# Patient Record
Sex: FEMALE | Race: WHITE | NOT HISPANIC OR LATINO | URBAN - METROPOLITAN AREA
[De-identification: names, ages, dates, MRNs, and addresses within clinical notes are randomized per-mention and may not be internally consistent; named-entity substitution may affect disease eponyms.]

---

## 2021-04-13 ENCOUNTER — EMERGENCY (EMERGENCY)
Facility: HOSPITAL | Age: 66
LOS: 1 days | Discharge: HOME | End: 2021-04-13
Attending: EMERGENCY MEDICINE
Payer: COMMERCIAL

## 2021-04-13 VITALS
TEMPERATURE: 99 F | SYSTOLIC BLOOD PRESSURE: 146 MMHG | RESPIRATION RATE: 18 BRPM | WEIGHT: 235.01 LBS | HEART RATE: 1 BPM | DIASTOLIC BLOOD PRESSURE: 82 MMHG | HEIGHT: 64 IN | OXYGEN SATURATION: 96 %

## 2021-04-13 VITALS
OXYGEN SATURATION: 99 % | RESPIRATION RATE: 17 BRPM | TEMPERATURE: 98 F | DIASTOLIC BLOOD PRESSURE: 81 MMHG | SYSTOLIC BLOOD PRESSURE: 157 MMHG | HEART RATE: 81 BPM

## 2021-04-13 DIAGNOSIS — Z90.12 ACQUIRED ABSENCE OF LEFT BREAST AND NIPPLE: Chronic | ICD-10-CM

## 2021-04-13 DIAGNOSIS — Z98.89 OTHER SPECIFIED POSTPROCEDURAL STATES: Chronic | ICD-10-CM

## 2021-04-13 LAB
ALBUMIN SERPL ELPH-MCNC: 4 G/DL — SIGNIFICANT CHANGE UP (ref 3.3–5)
ALP SERPL-CCNC: 103 U/L — SIGNIFICANT CHANGE UP (ref 40–120)
ALT FLD-CCNC: 48 U/L — HIGH (ref 10–45)
ANION GAP SERPL CALC-SCNC: 12 MMOL/L — SIGNIFICANT CHANGE UP (ref 5–17)
APPEARANCE UR: CLEAR — SIGNIFICANT CHANGE UP
AST SERPL-CCNC: 30 U/L — SIGNIFICANT CHANGE UP (ref 10–40)
BACTERIA # UR AUTO: NEGATIVE — SIGNIFICANT CHANGE UP
BASE EXCESS BLDV CALC-SCNC: 1 MMOL/L — SIGNIFICANT CHANGE UP (ref -2–2)
BASOPHILS # BLD AUTO: 0.03 K/UL — SIGNIFICANT CHANGE UP (ref 0–0.2)
BASOPHILS NFR BLD AUTO: 0.5 % — SIGNIFICANT CHANGE UP (ref 0–2)
BILIRUB SERPL-MCNC: 0.6 MG/DL — SIGNIFICANT CHANGE UP (ref 0.2–1.2)
BILIRUB UR-MCNC: NEGATIVE — SIGNIFICANT CHANGE UP
BUN SERPL-MCNC: 12 MG/DL — SIGNIFICANT CHANGE UP (ref 7–23)
CA-I SERPL-SCNC: 1.23 MMOL/L — SIGNIFICANT CHANGE UP (ref 1.12–1.3)
CALCIUM SERPL-MCNC: 9.4 MG/DL — SIGNIFICANT CHANGE UP (ref 8.4–10.5)
CHLORIDE BLDV-SCNC: 108 MMOL/L — SIGNIFICANT CHANGE UP (ref 96–108)
CHLORIDE SERPL-SCNC: 105 MMOL/L — SIGNIFICANT CHANGE UP (ref 96–108)
CO2 BLDV-SCNC: 29 MMOL/L — SIGNIFICANT CHANGE UP (ref 22–30)
CO2 SERPL-SCNC: 24 MMOL/L — SIGNIFICANT CHANGE UP (ref 22–31)
COLOR SPEC: YELLOW — SIGNIFICANT CHANGE UP
CREAT SERPL-MCNC: 0.59 MG/DL — SIGNIFICANT CHANGE UP (ref 0.5–1.3)
DIFF PNL FLD: NEGATIVE — SIGNIFICANT CHANGE UP
EOSINOPHIL # BLD AUTO: 0.12 K/UL — SIGNIFICANT CHANGE UP (ref 0–0.5)
EOSINOPHIL NFR BLD AUTO: 1.9 % — SIGNIFICANT CHANGE UP (ref 0–6)
EPI CELLS # UR: 2 /HPF — SIGNIFICANT CHANGE UP
GAS PNL BLDV: 139 MMOL/L — SIGNIFICANT CHANGE UP (ref 135–145)
GAS PNL BLDV: SIGNIFICANT CHANGE UP
GAS PNL BLDV: SIGNIFICANT CHANGE UP
GLUCOSE BLDV-MCNC: 147 MG/DL — HIGH (ref 70–99)
GLUCOSE SERPL-MCNC: 151 MG/DL — HIGH (ref 70–99)
GLUCOSE UR QL: NEGATIVE — SIGNIFICANT CHANGE UP
HCO3 BLDV-SCNC: 27 MMOL/L — SIGNIFICANT CHANGE UP (ref 21–29)
HCT VFR BLD CALC: 43.1 % — SIGNIFICANT CHANGE UP (ref 34.5–45)
HCT VFR BLDA CALC: 45 % — SIGNIFICANT CHANGE UP (ref 39–50)
HGB BLD CALC-MCNC: 14.7 G/DL — SIGNIFICANT CHANGE UP (ref 11.5–15.5)
HGB BLD-MCNC: 14.1 G/DL — SIGNIFICANT CHANGE UP (ref 11.5–15.5)
HYALINE CASTS # UR AUTO: 2 /LPF — SIGNIFICANT CHANGE UP (ref 0–2)
IMM GRANULOCYTES NFR BLD AUTO: 0.5 % — SIGNIFICANT CHANGE UP (ref 0–1.5)
KETONES UR-MCNC: ABNORMAL
LACTATE BLDV-MCNC: 1.9 MMOL/L — SIGNIFICANT CHANGE UP (ref 0.7–2)
LEUKOCYTE ESTERASE UR-ACNC: ABNORMAL
LIDOCAIN IGE QN: 30 U/L — SIGNIFICANT CHANGE UP (ref 7–60)
LYMPHOCYTES # BLD AUTO: 1.15 K/UL — SIGNIFICANT CHANGE UP (ref 1–3.3)
LYMPHOCYTES # BLD AUTO: 18.5 % — SIGNIFICANT CHANGE UP (ref 13–44)
MCHC RBC-ENTMCNC: 30.3 PG — SIGNIFICANT CHANGE UP (ref 27–34)
MCHC RBC-ENTMCNC: 32.7 GM/DL — SIGNIFICANT CHANGE UP (ref 32–36)
MCV RBC AUTO: 92.7 FL — SIGNIFICANT CHANGE UP (ref 80–100)
MONOCYTES # BLD AUTO: 0.53 K/UL — SIGNIFICANT CHANGE UP (ref 0–0.9)
MONOCYTES NFR BLD AUTO: 8.5 % — SIGNIFICANT CHANGE UP (ref 2–14)
NEUTROPHILS # BLD AUTO: 4.36 K/UL — SIGNIFICANT CHANGE UP (ref 1.8–7.4)
NEUTROPHILS NFR BLD AUTO: 70.1 % — SIGNIFICANT CHANGE UP (ref 43–77)
NITRITE UR-MCNC: NEGATIVE — SIGNIFICANT CHANGE UP
NRBC # BLD: 0 /100 WBCS — SIGNIFICANT CHANGE UP (ref 0–0)
PCO2 BLDV: 52 MMHG — HIGH (ref 39–42)
PH BLDV: 7.34 — LOW (ref 7.35–7.45)
PH UR: 5.5 — SIGNIFICANT CHANGE UP (ref 5–8)
PLATELET # BLD AUTO: 272 K/UL — SIGNIFICANT CHANGE UP (ref 150–400)
PO2 BLDV: 22 MMHG — LOW (ref 25–45)
POTASSIUM BLDV-SCNC: 4.8 MMOL/L — SIGNIFICANT CHANGE UP (ref 3.5–5.3)
POTASSIUM SERPL-MCNC: 4.5 MMOL/L — SIGNIFICANT CHANGE UP (ref 3.5–5.3)
POTASSIUM SERPL-SCNC: 4.5 MMOL/L — SIGNIFICANT CHANGE UP (ref 3.5–5.3)
PROT SERPL-MCNC: 6.5 G/DL — SIGNIFICANT CHANGE UP (ref 6–8.3)
PROT UR-MCNC: SIGNIFICANT CHANGE UP
RBC # BLD: 4.65 M/UL — SIGNIFICANT CHANGE UP (ref 3.8–5.2)
RBC # FLD: 12.9 % — SIGNIFICANT CHANGE UP (ref 10.3–14.5)
RBC CASTS # UR COMP ASSIST: 2 /HPF — SIGNIFICANT CHANGE UP (ref 0–4)
SAO2 % BLDV: 33 % — LOW (ref 67–88)
SODIUM SERPL-SCNC: 141 MMOL/L — SIGNIFICANT CHANGE UP (ref 135–145)
SP GR SPEC: 1.03 — HIGH (ref 1.01–1.02)
UROBILINOGEN FLD QL: NEGATIVE — SIGNIFICANT CHANGE UP
WBC # BLD: 6.22 K/UL — SIGNIFICANT CHANGE UP (ref 3.8–10.5)
WBC # FLD AUTO: 6.22 K/UL — SIGNIFICANT CHANGE UP (ref 3.8–10.5)
WBC UR QL: 7 /HPF — HIGH (ref 0–5)

## 2021-04-13 PROCEDURE — 82947 ASSAY GLUCOSE BLOOD QUANT: CPT

## 2021-04-13 PROCEDURE — 99284 EMERGENCY DEPT VISIT MOD MDM: CPT | Mod: 25

## 2021-04-13 PROCEDURE — 82803 BLOOD GASES ANY COMBINATION: CPT

## 2021-04-13 PROCEDURE — 84132 ASSAY OF SERUM POTASSIUM: CPT

## 2021-04-13 PROCEDURE — 85014 HEMATOCRIT: CPT

## 2021-04-13 PROCEDURE — 82435 ASSAY OF BLOOD CHLORIDE: CPT

## 2021-04-13 PROCEDURE — 84295 ASSAY OF SERUM SODIUM: CPT

## 2021-04-13 PROCEDURE — 87077 CULTURE AEROBIC IDENTIFY: CPT

## 2021-04-13 PROCEDURE — 83605 ASSAY OF LACTIC ACID: CPT

## 2021-04-13 PROCEDURE — 85025 COMPLETE CBC W/AUTO DIFF WBC: CPT

## 2021-04-13 PROCEDURE — 81001 URINALYSIS AUTO W/SCOPE: CPT

## 2021-04-13 PROCEDURE — 99285 EMERGENCY DEPT VISIT HI MDM: CPT

## 2021-04-13 PROCEDURE — 74177 CT ABD & PELVIS W/CONTRAST: CPT

## 2021-04-13 PROCEDURE — 74177 CT ABD & PELVIS W/CONTRAST: CPT | Mod: 26,MA

## 2021-04-13 PROCEDURE — 87086 URINE CULTURE/COLONY COUNT: CPT

## 2021-04-13 PROCEDURE — 85018 HEMOGLOBIN: CPT

## 2021-04-13 PROCEDURE — 83690 ASSAY OF LIPASE: CPT

## 2021-04-13 PROCEDURE — 82330 ASSAY OF CALCIUM: CPT

## 2021-04-13 PROCEDURE — 80053 COMPREHEN METABOLIC PANEL: CPT

## 2021-04-13 PROCEDURE — 87186 SC STD MICRODIL/AGAR DIL: CPT

## 2021-04-13 NOTE — ED PROVIDER NOTE - NSFOLLOWUPCLINICS_GEN_ALL_ED_FT
[facility]  General Surgery  [address] [place] [address] - 3rd [place] [address]  Phone: [phone]  Fax:   Follow Up Time: Routine

## 2021-04-13 NOTE — ED PROVIDER NOTE - PROGRESS NOTE DETAILS
Alban Coffey, PGY2: CT negative for SBO, only showing hernia above the mesh. Labs non-actionable. UA borderline, no dysuria, will await culture. Discussed results w/ patient. Pain currently controlled if patient not bending over or lifting. Will give surgery referral. Discussed return precautions and all questions answered. Pt in agreement w/ plan. CAOx3, NAD, VSS. Stable for d/c.

## 2021-04-13 NOTE — ED ADULT NURSE NOTE - OBJECTIVE STATEMENT
pt is 66 y/o female A&Ox4 arriving to the ED complaining of abdominal pain for 5 days. pt belly is distended states her bowel movements have been soft or diarrhea like but has been having daily bowel movements.  pt has a history of hernia surgeries in the past, gastric ulcers, states she has a unrepaired hernia above her naval that can be felt on palpation. pt complaining of nausea no vomiting. states she had seen her primary care doctor Friday and had a negative covid test, pt PMH DM hypertension, breast CA, hysterotomy

## 2021-04-13 NOTE — ED PROVIDER NOTE - NSFOLLOWUPINSTRUCTIONS_ED_ALL_ED_FT
Hernia    A hernia is when fat or the intestines push through a weak area in the abdominal wall. This can occur around the belly button (umbilical hernia) or where the leg meets the lower abdomen (inguinal hernia). This creates a rounded lump (bulge). Hernias that can be pushed back into the belly are called reducible and those that cannot are called incarcerated. Hernias that are not reducible and lose their blood supply are called strangulated and require emergency surgery. Hernias can be caused or worsened when straining during bowel movements or lifting heavy objects as well as coughing or sneezing. Surgery may be helpful in treating this condition so follow up with a surgeon.    Take Tylenol 650mg (Two 325 mg pills) every 4-6 hours as needed for pain. Avoid heavy lifting and bending at the waist. Follow up with surgery as above or follow up with your prior surgeon.     SEEK IMMEDIATE MEDICAL CARE IF YOU HAVE ANY OF THE FOLLOWING SYMPTOMS: worsening abdominal pain, change in color over the hernia, nausea/vomiting, or inability to have a bowel movement or pass gas.

## 2021-04-13 NOTE — ED PROVIDER NOTE - CLINICAL SUMMARY MEDICAL DECISION MAKING FREE TEXT BOX
Alban Coffey, PGY2: 65 year old female pshx multiple abd surgeries p/w diffuse abd pain x10 days w/ period of relief in between. A/w nausea, +flatus, normal BM. Abd distended, soft, diffuse mild ttp, no rebound, +BS. Not clinically obstructed, consider worsening hernia vs colitis vs pancreatitis. Plan for labs, CT A/P, UA/UCx. Offered and declined pain meds and anti-emetics.

## 2021-04-13 NOTE — ED PROVIDER NOTE - ATTENDING CONTRIBUTION TO CARE
Patient presenting complaining of 10 days of abdominal pains associated with nausea but no vomiting and loose BMs (about 2 times) daily.  Feels achy, worse with lifting or changing position.  No changes in appetite.  Tested negative for COVID since onset of symptoms, no known contacts, not yet vaccinated.  No recent travel or antibiotic courses.  Declining medications for nausea or pain at this time.  Located infraumbilically.  No associated chest pains, shortness of breath, dysuria or hematuria.    A 14 point review of systems is negative except as in HPI or otherwise documented.    Exam:  General: Patient well appearing, vital signs within normal limits  HEENT: airway patent with moist mucous membranes  Cardiac: RRR S1/S2 with strong peripheral pulses  Respiratory: lungs clear without respiratory distress  GI: abdomen soft, morbidly obese, mildly tender peribumbilically, non distended  Neuro: no gross neurologic deficits  Skin: warm, well perfused  Psych: normal mood and affect    Patient presenting with multiple days of nonspecific abdominal pains and nausea.  Clinically not obstructed.  Could be mild pancreatitis or colitis?  Plan for labs, CT, re-evaluate.

## 2021-04-13 NOTE — ED PROVIDER NOTE - NS ED ROS FT
GENERAL: no fever, no chills  EYES: no change in vision, no irritation, no discharge, no redness, no pain  HEENT: no trouble swallowing or speaking, no throat pain, no ear pain  CARDIAC: no chest pain, no palpitations   PULMONARY: no cough, no shortness of breath, no wheezing  GI: +abdominal pain, +nausea, no vomiting, no diarrhea, no constipation, no melena, no hematochezia, no hematemesis  : no changes in urination, no dysuria, no frequency, no hematuria, no discharge  SKIN: no rashes  NEURO: no headache, no numbness, no weakness  MSK: no joint pain, no muscle pain, no back pain, no calf pain

## 2021-04-13 NOTE — ED PROVIDER NOTE - PMH
Anxiety    Breast cancer    Breast Cancer  left side, diagnosed 2009, lumpectomy and Radiation treatment performed  Degenerative Disc Disease  thoracic and lumbar  Diabetes Mellitus  no meds, diet controlled, sugars 110-160  DM (diabetes mellitus)    Glaucoma  suspect, pending eval after surgery  History of Psoriasis    HTN (hypertension)    Hypertension    Phlebitis  right leg, 3/11  Uterine Polyp    Vitamin D Deficiency

## 2021-04-13 NOTE — ED PROVIDER NOTE - PHYSICAL EXAMINATION
GENERAL: A&Ox3, non-toxic appearing, no acute distress  HEENT: NCAT, EOMI, oral mucosa moist, normal conjunctiva  RESP: CTAB, no respiratory distress, no wheezes/rhonchi/rales, speaking in full sentences  CV: RRR, no murmurs/rubs/gallops  ABDOMEN: soft, mild ttp diffusely, distended, no guarding, no rebound, +bowel sounds x4 quadrants  MSK: no visible deformities  NEURO: no focal sensory or motor deficits, CN 2-12 grossly intact  SKIN: warm, normal color, well perfused, no rash  PSYCH: normal affect

## 2021-04-13 NOTE — ED PROVIDER NOTE - RAPID ASSESSMENT
65 F with PMHx of DM, abdominal hernia and PSHx s/p multiple hernia repair presents with x10 days of abdominal pain, abdominal distention, and nausea associated with decrease PO intake. Denies flatus. +loose stools. Noted dark urine. Was tested negative twice for COVID at outpatient facility. Denies hematochezia, vomiting, fever, chills, or burning with urination.     Scribe Statement: ITomi Tiffany, attest that this documentation has been prepared under the direction and in the presence of Adrian Bonilla) 65 F with PMHx of DM, abdominal hernia and PSHx s/p multiple hernia repair presents with x10 days of abdominal pain, abdominal distention, and nausea associated with decrease PO intake. Denies flatus. +loose stools. Noted dark urine. Was tested negative twice for COVID at outpatient facility. Denies hematochezia, vomiting, fever, chills, or burning with urination.     Scribe Statement: I, Carline Krishna, attest that this documentation has been prepared under the direction and in the presence of Adrian Bonilla)    I, Dr. Bonilla, personally performed the service described in the documentation recorded by the scribe in my presence, and it accurately and completely records my words and actions.

## 2021-04-13 NOTE — ED PROVIDER NOTE - CHIEF COMPLAINT
The patient is a 65y Female complaining of  The patient is a 65y Female complaining of abdominal pain.

## 2021-04-13 NOTE — ED PROVIDER NOTE - PSH
Section  twice  H/O hernia repair    H/O left mastectomy    S/P Breast Lumpectomy  left, 2009  S/P Hernia Repair  abdominal incisional hernia reparied twice with no success,   S/P Hernia Repair    S/P Tonsillectomy and Adenoidectomy    Varicose Vein of Leg  vein stripping bilateral

## 2021-04-16 LAB
-  AMIKACIN: SIGNIFICANT CHANGE UP
-  AZTREONAM: SIGNIFICANT CHANGE UP
-  CEFEPIME: SIGNIFICANT CHANGE UP
-  CEFTAZIDIME: SIGNIFICANT CHANGE UP
-  CIPROFLOXACIN: SIGNIFICANT CHANGE UP
-  GENTAMICIN: SIGNIFICANT CHANGE UP
-  IMIPENEM: SIGNIFICANT CHANGE UP
-  LEVOFLOXACIN: SIGNIFICANT CHANGE UP
-  MEROPENEM: SIGNIFICANT CHANGE UP
-  PIPERACILLIN/TAZOBACTAM: SIGNIFICANT CHANGE UP
-  TOBRAMYCIN: SIGNIFICANT CHANGE UP
CULTURE RESULTS: SIGNIFICANT CHANGE UP
METHOD TYPE: SIGNIFICANT CHANGE UP
ORGANISM # SPEC MICROSCOPIC CNT: SIGNIFICANT CHANGE UP
ORGANISM # SPEC MICROSCOPIC CNT: SIGNIFICANT CHANGE UP
SPECIMEN SOURCE: SIGNIFICANT CHANGE UP

## 2021-04-16 RX ORDER — CIPROFLOXACIN LACTATE 400MG/40ML
1 VIAL (ML) INTRAVENOUS
Qty: 5 | Refills: 0
Start: 2021-04-16 | End: 2021-04-20

## 2021-04-16 NOTE — ED POST DISCHARGE NOTE - DETAILS
4/16/21: d/w pt who reports she is still having abdominal pain as reported in the ED, no dysuria or frequency, pt reports she may have mild back pain but has herniated discs and is unsure if this pain is different from baseline. Denies f/c. At this time given abd pain and colony count as well as organism, would Rx abx, [id number] to cover pseudomonas and strep. Confirmed allergy only to flexeril, Rx sent to preferred pharmacy. -Kam Abebe PA-C

## 2021-04-16 NOTE — ED POST DISCHARGE NOTE - RESULT SUMMARY
4/16/21: Final UCx >100k P aeruginosa pansensitive, 10-49k Strep anginosus(susceptibilities not performed). -Kam Abebe PA-C

## 2021-04-27 ENCOUNTER — APPOINTMENT (OUTPATIENT)
Dept: INTERNAL MEDICINE | Facility: CLINIC | Age: 66
End: 2021-04-27

## 2022-06-15 ENCOUNTER — EMERGENCY (EMERGENCY)
Facility: HOSPITAL | Age: 67
LOS: 1 days | Discharge: HOME | End: 2022-06-15
Attending: EMERGENCY MEDICINE
Payer: COMMERCIAL

## 2022-06-15 VITALS
WEIGHT: 240.08 LBS | HEIGHT: 63 IN | SYSTOLIC BLOOD PRESSURE: 147 MMHG | TEMPERATURE: 98 F | DIASTOLIC BLOOD PRESSURE: 80 MMHG | RESPIRATION RATE: 17 BRPM | HEART RATE: 83 BPM | OXYGEN SATURATION: 98 %

## 2022-06-15 DIAGNOSIS — Z98.89 OTHER SPECIFIED POSTPROCEDURAL STATES: Chronic | ICD-10-CM

## 2022-06-15 DIAGNOSIS — Z90.12 ACQUIRED ABSENCE OF LEFT BREAST AND NIPPLE: Chronic | ICD-10-CM

## 2022-06-15 PROCEDURE — 99284 EMERGENCY DEPT VISIT MOD MDM: CPT

## 2022-06-15 PROCEDURE — 73590 X-RAY EXAM OF LOWER LEG: CPT

## 2022-06-15 PROCEDURE — 90471 IMMUNIZATION ADMIN: CPT

## 2022-06-15 PROCEDURE — 73590 X-RAY EXAM OF LOWER LEG: CPT | Mod: 26,LT

## 2022-06-15 PROCEDURE — 73564 X-RAY EXAM KNEE 4 OR MORE: CPT | Mod: 26,LT

## 2022-06-15 PROCEDURE — 73564 X-RAY EXAM KNEE 4 OR MORE: CPT

## 2022-06-15 PROCEDURE — 73610 X-RAY EXAM OF ANKLE: CPT

## 2022-06-15 PROCEDURE — 99284 EMERGENCY DEPT VISIT MOD MDM: CPT | Mod: 25

## 2022-06-15 PROCEDURE — 90715 TDAP VACCINE 7 YRS/> IM: CPT

## 2022-06-15 PROCEDURE — 73630 X-RAY EXAM OF FOOT: CPT

## 2022-06-15 PROCEDURE — 73630 X-RAY EXAM OF FOOT: CPT | Mod: 26,LT

## 2022-06-15 PROCEDURE — 73610 X-RAY EXAM OF ANKLE: CPT | Mod: 26,LT

## 2022-06-15 RX ORDER — IBUPROFEN 200 MG
600 TABLET ORAL ONCE
Refills: 0 | Status: COMPLETED | OUTPATIENT
Start: 2022-06-15 | End: 2022-06-15

## 2022-06-15 RX ORDER — TETANUS TOXOID, REDUCED DIPHTHERIA TOXOID AND ACELLULAR PERTUSSIS VACCINE, ADSORBED 5; 2.5; 8; 8; 2.5 [IU]/.5ML; [IU]/.5ML; UG/.5ML; UG/.5ML; UG/.5ML
0.5 SUSPENSION INTRAMUSCULAR ONCE
Refills: 0 | Status: COMPLETED | OUTPATIENT
Start: 2022-06-15 | End: 2022-06-15

## 2022-06-15 RX ORDER — ACETAMINOPHEN 500 MG
975 TABLET ORAL ONCE
Refills: 0 | Status: COMPLETED | OUTPATIENT
Start: 2022-06-15 | End: 2022-06-15

## 2022-06-15 RX ADMIN — Medication 600 MILLIGRAM(S): at 14:24

## 2022-06-15 RX ADMIN — Medication 975 MILLIGRAM(S): at 14:24

## 2022-06-15 RX ADMIN — Medication 975 MILLIGRAM(S): at 14:54

## 2022-06-15 RX ADMIN — TETANUS TOXOID, REDUCED DIPHTHERIA TOXOID AND ACELLULAR PERTUSSIS VACCINE, ADSORBED 0.5 MILLILITER(S): 5; 2.5; 8; 8; 2.5 SUSPENSION INTRAMUSCULAR at 14:24

## 2022-06-15 RX ADMIN — Medication 600 MILLIGRAM(S): at 14:54

## 2022-06-15 NOTE — ED ADULT TRIAGE NOTE - CHIEF COMPLAINT QUOTE
back and knee pain with abrasion on lower leg. s/p mechanical fall while wheeling her mother. denies any LOC or injury to head.

## 2022-06-15 NOTE — ED PROVIDER NOTE - CHPI ED SYMPTOMS POS
back pain, leg pain, big toe pain, knee pain/ABRASION/PAIN back pain, leg pain, big toe pain, knee pain/ABRASION/PAIN/TENDERNESS

## 2022-06-15 NOTE — ED PROVIDER NOTE - PHYSICAL EXAMINATION
Skin: abrasion over left knee and left shin.  Musculoskeletal: tenderness to palpation at left knee, left shin, left toe and right lumbar paraspinal region.

## 2022-06-15 NOTE — ED PROVIDER NOTE - NSFOLLOWUPINSTRUCTIONS_ED_ALL_ED_FT
Please take over the counter pain medications such as Motrin (600mg every 6 hours) and Tylenol (650mg every 4 hours) for pain, and follow up with your primary care doctor within the next 2-3 days    Return to the Emergency Department if you have any new or worsening symptoms

## 2022-06-15 NOTE — ED PROVIDER NOTE - CLINICAL SUMMARY MEDICAL DECISION MAKING FREE TEXT BOX
66 year old female presenting with back pain and left leg pain after mechanical fall, vital sign is stable in ED, neurovascularly intact, tenderness to palpation of left knee, overlying abrasion and right paraspinal tenderness to palpation. Will evaluate dislocation of left leg, obtain x-ray, administer pain control, and reevaluate symptoms. 66 year old female presenting with back pain and left leg pain after mechanical fall, vital sign is stable in ED, neurovascularly intact, tenderness to palpation of left knee, overlying abrasion and right paraspinal tenderness to palpation. Will evaluate fracture dislocation of left leg, obtain x-ray, administer pain control, and reevaluate symptoms.

## 2022-06-15 NOTE — ED PROVIDER NOTE - ATTENDING CONTRIBUTION TO CARE
65 yo F with back and knee pain s/p mechanical slip and fall    Neuro exam intact  Ambulatory    xrays w/o fracture  Dc supportive care

## 2022-06-15 NOTE — ED PROVIDER NOTE - MUSCULOSKELETAL MINIMAL EXAM
tenderness to palpation at left knee, left shin, left big toe, tenderness to right lumbar paraspinal region.

## 2022-06-15 NOTE — ED PROVIDER NOTE - NS ED MD DISPO DISCHARGE CCDA
Patient recently seen in clinic. Please address this request. Thank you!    Angelic Tran RN     Patient/Caregiver provided printed discharge information.

## 2022-06-15 NOTE — ED PROVIDER NOTE - PATIENT PORTAL LINK FT
You can access the FollowMyHealth Patient Portal offered by [facility] by registering at the following website: [email or web address]. By joining [employer]’s FollowMyHealth portal, you will also be able to view your health information using other applications (apps) compatible with our system.

## 2022-06-15 NOTE — ED ADULT NURSE NOTE - NSIMPLEMENTINTERV_GEN_ALL_ED
Implemented All Fall Risk Interventions:  [place] to call system. Call bell, personal items and telephone within reach. Instruct patient to call for assistance. Room bathroom lighting operational. Non-slip footwear when patient is off stretcher. Physically safe environment: no spills, clutter or unnecessary equipment. Stretcher in lowest position, wheels locked, appropriate side rails in place. Provide visual cue, wrist band, yellow gown, etc. Monitor gait and stability. Monitor for mental status changes and reorient to person, place, and time. Review medications for side effects contributing to fall risk. Reinforce activity limits and safety measures with patient and family.

## 2022-06-15 NOTE — ED PROVIDER NOTE - OBJECTIVE STATEMENT
66 year old female with PHMx of breast cancer and endometriosis (currently on hormonal therapy, no active chemo radiation or hemo therapy), hypertension, hyperlipidemia and type 2 diabetes presents to the ED with chief complaint of back pain and leg pain after mechanical fall. Patient states about 3 hours ago, she was wheeling her mother when she tripped over her foot and fell down 4 steps. Patient otherwise denies any head trauma, loss of consciousness, visional changes, nausea, vomiting, shortness of breath, chest pain, and abdominal pain. Patient currently in ED complaining of left lower leg, left knee pain, left big toe pain along with back lower pain. NKDA. 66 year old female with PHMx of breast cancer and endometriosis (currently on hormonal therapy, no active chemo radiation or hemo therapy), hypertension, hyperlipidemia and type 2 diabetes presents to the ED with chief complaint of back pain and leg pain after mechanical fall. Patient states about 3 hours ago, she was wheeling her mother when she tripped over her foot and fell down 4 steps. Patient otherwise denies any head trauma, loss of consciousness, visional changes, nausea, vomiting, shortness of breath, chest pain, and abdominal pain. Patient currently in ED endorsing left lower leg, left knee pain, left big toe pain along with back lower pain. NKDA.

## 2022-06-15 NOTE — ED ADULT NURSE NOTE - OBJECTIVE STATEMENT
c/o lower back pain and left knee pain s.p mechanical fall. No LOC. denies hitting her head. No on AC

## 2022-06-15 NOTE — ED PROVIDER NOTE - PROGRESS NOTE DETAILS
Sachin SCHMIDT (PGY-2)  explained results of w/u to pt, given return precautions. ambulating independently. will d/c to home with pcp f/u

## 2022-06-15 NOTE — ED PROVIDER NOTE - CHPI ED SYMPTOMS NEG
no head trauma, no nausea, no shortness of breath, no visional changes, no chest pain, no abdominal pain/no loss of consciousness/no vomiting

## 2022-06-15 NOTE — ED PROVIDER NOTE - NSICDXPASTMEDICALHX_GEN_ALL_CORE_FT
PAST MEDICAL HISTORY:  Breast cancer     Diabetes mellitus, type 2     Endometriosis     HLD (hyperlipidemia)     HTN (hypertension)

## 2022-11-20 ENCOUNTER — EMERGENCY (EMERGENCY)
Facility: HOSPITAL | Age: 67
LOS: 1 days | Discharge: HOME | End: 2022-11-20
Attending: EMERGENCY MEDICINE
Payer: MEDICARE

## 2022-11-20 VITALS
RESPIRATION RATE: 15 BRPM | HEIGHT: 63 IN | OXYGEN SATURATION: 98 % | WEIGHT: 240.08 LBS | DIASTOLIC BLOOD PRESSURE: 74 MMHG | TEMPERATURE: 98 F | HEART RATE: 78 BPM | SYSTOLIC BLOOD PRESSURE: 149 MMHG

## 2022-11-20 VITALS
HEART RATE: 85 BPM | DIASTOLIC BLOOD PRESSURE: 79 MMHG | TEMPERATURE: 99 F | SYSTOLIC BLOOD PRESSURE: 120 MMHG | OXYGEN SATURATION: 99 %

## 2022-11-20 DIAGNOSIS — Z90.12 ACQUIRED ABSENCE OF LEFT BREAST AND NIPPLE: Chronic | ICD-10-CM

## 2022-11-20 DIAGNOSIS — Z98.89 OTHER SPECIFIED POSTPROCEDURAL STATES: Chronic | ICD-10-CM

## 2022-11-20 PROBLEM — E11.9 TYPE 2 DIABETES MELLITUS WITHOUT COMPLICATIONS: Chronic | Status: ACTIVE | Noted: 2022-06-15

## 2022-11-20 PROBLEM — I10 ESSENTIAL (PRIMARY) HYPERTENSION: Chronic | Status: ACTIVE | Noted: 2022-06-15

## 2022-11-20 PROBLEM — N80.9 ENDOMETRIOSIS, UNSPECIFIED: Chronic | Status: ACTIVE | Noted: 2022-06-15

## 2022-11-20 PROBLEM — C50.919 MALIGNANT NEOPLASM OF UNSPECIFIED SITE OF UNSPECIFIED FEMALE BREAST: Chronic | Status: ACTIVE | Noted: 2022-06-15

## 2022-11-20 PROBLEM — E78.5 HYPERLIPIDEMIA, UNSPECIFIED: Chronic | Status: ACTIVE | Noted: 2022-06-15

## 2022-11-20 PROCEDURE — 93971 EXTREMITY STUDY: CPT

## 2022-11-20 PROCEDURE — 93971 EXTREMITY STUDY: CPT | Mod: 26,LT

## 2022-11-20 PROCEDURE — 99284 EMERGENCY DEPT VISIT MOD MDM: CPT | Mod: 25

## 2022-11-20 PROCEDURE — 99284 EMERGENCY DEPT VISIT MOD MDM: CPT

## 2022-11-20 RX ORDER — LIDOCAINE 4 G/100G
1 CREAM TOPICAL ONCE
Refills: 0 | Status: COMPLETED | OUTPATIENT
Start: 2022-11-20 | End: 2022-11-20

## 2022-11-20 RX ORDER — ACETAMINOPHEN 500 MG
975 TABLET ORAL ONCE
Refills: 0 | Status: COMPLETED | OUTPATIENT
Start: 2022-11-20 | End: 2022-11-20

## 2022-11-20 RX ADMIN — Medication 975 MILLIGRAM(S): at 09:59

## 2022-11-20 RX ADMIN — LIDOCAINE 1 PATCH: 4 CREAM TOPICAL at 10:00

## 2022-11-20 NOTE — ED PROVIDER NOTE - NSICDXPASTMEDICALHX_GEN_ALL_CORE_FT
PAST MEDICAL HISTORY:  Anxiety     Breast Cancer left side, diagnosed 2009, lumpectomy and Radiation treatment performed    Breast cancer     Degenerative Disc Disease thoracic and lumbar    Diabetes Mellitus no meds, diet controlled, sugars 110-160    DM (diabetes mellitus)     Glaucoma suspect, pending eval after surgery    History of Psoriasis     HTN (hypertension)     Hypertension     Phlebitis right leg, 3/11    Uterine Polyp     Vitamin D Deficiency

## 2022-11-20 NOTE — ED PROVIDER NOTE - NSICDXPASTSURGICALHX_GEN_ALL_CORE_FT
PAST SURGICAL HISTORY:   Section twice    H/O hernia repair     H/O left mastectomy     S/P Breast Lumpectomy left, 2009    S/P Hernia Repair     S/P Hernia Repair abdominal incisional hernia reparied twice with no success,     S/P Tonsillectomy and Adenoidectomy     Varicose Vein of Leg vein stripping bilateral

## 2022-11-20 NOTE — ED PROVIDER NOTE - NSFOLLOWUPCLINICS_GEN_ALL_ED_FT
[facility]  Vascular Surgery  [address] [place] [address]  Phone: [phone]  Fax:     [facility]  General Surgery  [address] [place] [address]  Phone: [phone]  Fax:

## 2022-11-20 NOTE — ED PROVIDER NOTE - CLINICAL SUMMARY MEDICAL DECISION MAKING FREE TEXT BOX
Palomo Apodaca MD (PGY-3): The patient is a 67y Female with pmhx of breast cancer and endometriosis (currently on hormonal therapy, no active radiation or chemotherapy), hypertension, hyperlipidemia and type 2 diabetes presents to the ED with left calf pain. Will evaluate for DVT. Low suspicion for fracture or sciatica. Duplex US of LLE, Tylenol, lidocaine patch. Will reassess. Most likely DC home with vascular surgery f/u.

## 2022-11-20 NOTE — ED PROVIDER NOTE - PHYSICAL EXAMINATION
Gen: NAD. A&Ox4. Non-toxic appearing.  HEENT: Normocephalic and atraumatic. PERRL, EOMI, no nasal discharge, mucous membranes moist, no scleral icterus.  CV: Regular rate and rhythm. S1/S2, no M/R/G. No significant lower extremity edema. Radial and DP pulses present and symmetrical. Capillary refill less than 2 seconds.  Resp: Normal effort and rate. CTAB, no rales, rhonchi, or wheezes.  GI: Abdomen soft, non-distended, non tender to palpation. No masses appreciated. Bowel sounds present.  MSK: LLE calf mildly TTP, sensation and strength intact, negative straight leg raise, no rashes, normal passive/active ROM  Neuro: Following commands, speaking in full sentences, moving extremities spontaneously  Psych: Appropriate mood, cooperative

## 2022-11-20 NOTE — ED PROVIDER NOTE - OBJECTIVE STATEMENT
The patient is a 67y Female with pmhx of breast cancer and endometriosis (currently on hormonal therapy, no active radiation or chemotherapy), hypertension, hyperlipidemia and type 2 diabetes presents to the ED with left calf pain. Denies any recent fall. Able to ambulate normally. Patient has hx of varicose veins. Says L calf has been hurting for several weeks, pain has woken her up from sleep. Not on blood thinners. No hx of DVT/PE. Denies fevers, cp, sob, abd pain, n/v/d, urinary symptoms. Allergic to flexeril.

## 2022-11-20 NOTE — ED PROVIDER NOTE - ATTENDING CONTRIBUTION TO CARE
RGUJRAL 68yo f hx listed presents with L calf and leg pain x few weeks that is progressively worsening. Pain is dull and constant and intermittent with movement. Pt has history of thrombophlebitis and varicose veins. No back pain, numbness, tingling, weakness, chest pain, sob, recent travel. No hx DVT. No f/c. Denies any trauma.   On exam, Patient is awake,alert,oriented x 3. Patient is well appearing and in no acute distress. Patient's chest is clear to ausculation, +s1s2. Abdomen is soft nd/nt +BS. Back: No midline T/L spine tenderness. Extremity with no swelling. LLE: + calf mild ttp. 2+ DP, nml sensation. Negative straight leg. No rash. 5/5 strength.   Pt well appearing, neuro intact. Vas duplex to ro DVT, pain control.

## 2022-11-20 NOTE — ED ADULT NURSE NOTE - OBJECTIVE STATEMENT
Patient presents to Ed with c/o lt calf pain. Patient with hx of varicose veins report experiencing lt calf pain for several  weeks and lately it has been when sleeping which wakes her up.

## 2022-11-20 NOTE — ED PROVIDER NOTE - NS ED ROS FT
GENERAL: No fever or chills  EYES: No change in vision  HEENT: No trouble swallowing or speaking  CARDIAC: No chest pain  PULMONARY: No cough or SOB  GI: No abdominal pain, no nausea or no vomiting, no diarrhea or constipation  : No changes in urination  SKIN: No rashes  NEURO: No headache, no numbness  MSK: +left calf pain  Otherwise as HPI or negative.

## 2022-11-20 NOTE — ED ADULT NURSE NOTE - NSIMPLEMENTINTERV_GEN_ALL_ED
Implemented All Fall Risk Interventions:  [place] to call system. Call bell, personal items and telephone within reach. Instruct patient to call for assistance. Room bathroom lighting operational. Non-slip footwear when patient is off stretcher. Physically safe environment: no spills, clutter or unnecessary equipment. Stretcher in lowest position, wheels locked, appropriate side rails in place. Provide visual cue, wrist band, yellow gown, etc. Monitor gait and stability. Monitor for mental status changes and reorient to person, place, and time. Review medications for side effects contributing to fall risk. Reinforce activity limits and safety measures with patient and family.

## 2022-11-20 NOTE — ED PROVIDER NOTE - PATIENT PORTAL LINK FT
You can access the FollowMyHealth Patient Portal offered by [facility] by registering at the following website: [email or web address]. By joining [employer]’s FollowMyHealth portal, you will also be able to view your health information using other applications (apps) compatible with our system.

## 2022-11-20 NOTE — ED PROVIDER NOTE - NSFOLLOWUPINSTRUCTIONS_ED_ALL_ED_FT
Please followup with a vascular surgeon. I have attached their contact information below. Please call to schedule an appointment.    You can use 500-1000mg Tylenol every 6 hours for pain - as needed.  This is an over-the-counter medications - please respect the warnings on the label. This medication come with certain risks and side effects that you need to discuss with your doctor, especially if you are taking it for a prolonged period.    You can use 400-600mg Ibuprofen (such as motrin or advil) every 6 to 8 hours as needed for pain control.  Take ibuprofen with food or milk to lessen stomach upset.  This is an over-the-counter medication please respect the warnings on the label. All medications come with certain risks and side effects that you need to discuss with your doctor, especially if you are taking them for a prolonged period.            Varicose Veins    AMBULATORY CARE:    Varicose veins are veins that become large, twisted, and swollen. They are common on the back of the calves, knees, and thighs. Varicose veins are caused by valves in your veins that do not work properly. This causes blood to collect and increase pressure in the veins of your legs. The increased pressure causes your veins to stretch, get larger, swell, and twist.  Varicose Veins         Common symptoms include the following: Your symptoms may be worse after you stand or sit for long periods of time. You may have any of the following:   •Blue, purple, or bulging veins in your legs       •Pain, swelling, or muscle cramps in your legs      •Feeling of fatigue or heaviness in your legs      •Cramping in your legs      Seek care immediately if:   •You have a wound that does not heal or is infected.      •You have an injury that has broken your skin and caused your varicose veins to bleed.      •Your leg is swollen and hard.      •You notice that your legs or feet are turning blue or black.      •Your leg feels warm, tender, and painful. It may look swollen and red.      Contact your healthcare provider if:   •You have pain in your leg that does not go away or gets worse.      •You notice sudden large bruising on your legs.       •You have a rash on your leg.       •Your symptoms keep you from doing your daily activities.      •You have questions or concerns about your condition or care.      Treatment of varicose veins aims to decrease symptoms, improve appearance, and prevent further problems. Treatment will depend on which veins are affected and how severe your condition is. You may need procedures to treat or remove your varicose veins. For example, your healthcare provider may inject a solution or use a laser to close the varicose veins. Surgery to remove long veins may also be done. Ask your healthcare provider for more information about procedures used to treat varicose veins.    Manage varicose veins:   •Do not sit or stand for long periods of time. This can cause the blood to collect in your legs and make your symptoms worse. Bend or rotate your ankles several times every hour. Walk around for a few minutes every hour to get blood moving in your legs.      •Do not cross your legs when you sit. This decreases blood flow to your feet and can make your symptoms worse.      •Do not wear tight clothing or shoes. Do not wear high-heeled shoes. Do not wear clothes that are tight around the waist or knees.      •Maintain a healthy weight. Being overweight or obese can make your varicose veins worse. Ask your healthcare provider how much you should weigh. Ask him or her to help you create a weight loss plan if you are overweight.       •Wear pressure stockings as directed. The stockings are tight and put pressure on your legs. They improve blood flow and help prevent clots.   Pressure Stockings            •Elevate your legs. Keep them above the level of your heart for 15 to 30 minutes several times a day. You can also prop the end of your bed up slightly to elevate your legs while you sleep. This will help blood to flow back to your heart.  Elevate Leg           •Get regular exercise. Talk to your healthcare provider about the best exercise plan for you. Exercise can improve blood flow to your legs and feet.  [employer] Walking for Exercise           Follow up with your doctor as directed: Write down your questions so you remember to ask them during your visits.

## 2022-11-20 NOTE — ED PROVIDER NOTE - PROGRESS NOTE DETAILS
[place]ton, PGY3: Discussed w/ charge nurse and infection control as patient was exposed to patient in a room who tested positive for COVID. Informed patient to follow up symptoms and patient understands and agrees Palomo Apodaca MD (PGY-3): Pt was re-evaluated at bedside, VSS. Results were discussed with patient as well as return precautions and follow up plan with PCP and vascular surgeon. Time was taken to answer any questions that the patient had before providing them with discharge paperwork.

## 2023-01-10 NOTE — ED ADULT TRIAGE NOTE - CHIEF COMPLAINT QUOTE
Physical Therapy Visit    Visit Type: Daily Treatment Note  Visit: 6  Referring Provider: Kathleen Gilmore MD  Medical Diagnosis (from order): Diagnosis Information    Diagnosis  996.49 (ICD-9-CM) - M96.662 (ICD-10-CM) - Fx femur fol insrt ortho implnt/prosth/bone plt, left leg (CMS/HCC)       Patient alert and oriented X3.    SUBJECTIVE                                                                                                               Pt cancelling last few appointments due to family medical issues. Pt very apologetic and motivated to return to therapy.    Pain / Symptoms  - Patient denies pain / symptoms.      OBJECTIVE                                                                                                                                    Treatment     Therapeutic Exercise  NuStep; seat 4; level 3; 5 minutes; UE 4    Therapeutic Activity  At elevated mat; single UE support  - 2\" foam step over x10  - 2\" lateral foal step over x10  - single leg on bosu, woodcutter x10, red ball  - forward lunge to airex x10 w/ 2# tiffany    Sit<>stand w/ no UE support on airex x10    Gait Training  10 - 4\" steps x2; CGA for knee control w/ reciprocal gait; single UE support; cueing to decrease L circumduction    Skilled input: verbal instruction/cues and tactile instruction/cues    Writer verbally educated and received verbal consent for hand placement, positioning of patient, and techniques to be performed today from patient for hand placement and palpation for techniques and therapist position for techniques as described above and how they are pertinent to the patient's plan of care.    Home Exercise Program  Access Code: [email or web address]  URL: [email or web address]  Date: 11/03/2022  Prepared by: SARAH HORAN    Exercises  · Walking - 1 x daily - 7 x weekly  · Standing March with Counter Support - 1 x daily - 7 x weekly - 2 sets - 10 reps  · Standing Hip Abduction with Counter Support - 1 x  daily - 7 x weekly - 2 sets - 10 reps  · Standing Hip Extension with Counter Support - 1 x daily - 7 x weekly - 2 sets - 10 reps  · Standing Heel Raise with Support - 1 x daily - 7 x weekly - 2 sets - 10 reps      ASSESSMENT                                                                                                            Session focusing on increased knee flexion with all activities as well as improve balance to decrease risk of falls. Cueing for knee flexion during swing to improve gait quality.  Pain/symptoms after session (out of 10): 0  Education:   - Results of above outlined education: Verbalizes understanding and Demonstrates understanding    PLAN                                                                                                                           Suggestions for next session as indicated: Progress per plan of care       Therapy procedure time and total treatment time can be found documented on the Time Entry flowsheet     abd pain, distention, nausea